# Patient Record
Sex: FEMALE | Race: WHITE
[De-identification: names, ages, dates, MRNs, and addresses within clinical notes are randomized per-mention and may not be internally consistent; named-entity substitution may affect disease eponyms.]

---

## 2017-01-22 ENCOUNTER — HOSPITAL ENCOUNTER (EMERGENCY)
Dept: HOSPITAL 53 - M ED | Age: 27
Discharge: HOME | End: 2017-01-22
Payer: COMMERCIAL

## 2017-01-22 DIAGNOSIS — R21: Primary | ICD-10-CM

## 2017-01-22 DIAGNOSIS — F17.210: ICD-10-CM

## 2017-01-22 PROCEDURE — 96374 THER/PROPH/DIAG INJ IV PUSH: CPT

## 2017-01-22 PROCEDURE — 99284 EMERGENCY DEPT VISIT MOD MDM: CPT

## 2017-01-22 PROCEDURE — 96375 TX/PRO/DX INJ NEW DRUG ADDON: CPT

## 2017-01-22 NOTE — EDDOCDS
Physician Documentation                                                                           

[facility]                                                                         

Name: Windy Arita                                                                              

Age: 26 yrs                                                                                       

Sex: Female                                                                                       

: 1990                                                                                   

MRN: [id number]                                                                                     

Arrival Date: 2017                                                                          

Time: 20:37                                                                                       

Account#: [id number]                                                                              

Bed 8                                                                                             

Private MD: NO PRIMARY PHYSICIAN, .                                                               

Disposition:                                                                                      

17 22:49 Discharged to Home/Self Care. Impression: Allergy, unspecified.                    

- Condition is Stable.                                                                            

                                                                                                  

- Prescriptions for Prednisone 20 mg Oral Tablet - take 3 tablets by ORAL route once              

daily for 3 days; 9 tablet.                                                                     

- Medication Reconciliation, Local Pharmacy Hours form.                                           

- Follow up: Private Physician; When: Call to arrange an appointment; Reason: Recheck             

today's complaints.                                                                             

- Problem is new.                                                                                 

- Symptoms have improved.                                                                         

                                                                                                  

                                                                                                  

                                                                                                  

Historical:                                                                                       

- Allergies: No known drug Allergies;                                                             

- Home Meds:                                                                                      

1. none                                                                                         

- PMHx: none;                                                                                     

- PSHx: none;                                                                                     

- Social history: Smoking status: Patient uses tobacco products, heavy tobacco smoker.            

No barriers to communication noted, The patient speaks fluent English, Speaks                   

appropriately for age.                                                                          

- Family history: Not pertinent.                                                                  

- : The pt / caregiver states he / she is not on anticoagulants. Home medication list             

is obtained from the patient.                                                                   

- Exposure Risk Screening:: None identified.                                                      

                                                                                                  

                                                                                                  

OB/GYN:                                                                                           

                                                                                             

23:02 LMP N/A - Birth control method                                                          ko2 

                                                                                                  

Vital Signs:                                                                                      

20:39  / 86; Pulse 93; Resp 18 S; Temp 98.4(O); Pulse Ox 99% on R/A; Weight 97.52 kg /  gr2 

      214.99 lbs (R); Height 5 ft. 4 in. (162.56 cm) (R); Pain 2/10;                              

22:51  / 76; Pulse 68; Resp 18; Temp 97.2(TE); Pulse Ox 100% on R/A;                    kermit 

20:39 Body Mass Index 36.90 (97.52 kg, 162.56 cm)                                             gr2 

                                                                                                  

MDM:                                                                                              

20:53 IV Saline Lock ordered.                                                                 cs11

20:53 diphenhydrAMINE 50 mg IVP once ordered.                                                 cs11

20:53 Dexamethasone 12 mg IV at bolus once ordered.                                           cs11

20:58 diphenhydrAMINE 25 mg IVP once ordered.                                                 cs11

20:59 Financial registration complete.                                                        gb  

21:36 [facility] Payment Agreement was scanned into [employer] and attached to record.               gb  

                                                                                                  

Administered Medications:                                                                         

20:58 Not Given (..): diphenhydrAMINE 50 mg IVP once                                          cs11

21:08 Drug: diphenhydrAMINE 25 mg [diphenhydramine 50 mg/mL injection solution (0.5 mL)]      ko2 

      Route: IVP; Site: left antecubital;                                                         

21:09 Drug: Dexamethasone 12 mg [dexamethasone 4 mg/mL injection solution] Route: IV; Rate:   ko2 

      bolus; Site: left antecubital;                                                              

                                                                                                  

                                                                                                  

Signatures:                                                                                       

Clarissa Walsh, Reg                  Reg  Otilio Hamilton DO                       DO   cs11                                                 

Dannielle AlcarazRN                           RN   ko2                                                  

                                                                                                  

The chart was reviewed and I authenticate all verbal orders and agree with the evaluation and 
treatment provided.Attachments:

21:36 [facility] Payment Agreement                                                                gb  

                                                                                                  

**************************************************************************************************

MTDD

## 2017-01-22 NOTE — EDDOCDS
Nurse's Notes                                                                                     

[facility]                                                                         

Name: Windy Arita                                                                              

Age: 26 yrs                                                                                       

Sex: Female                                                                                       

: 1990                                                                                   

MRN: [id number]                                                                                     

Arrival Date: 2017                                                                          

Time: 20:37                                                                                       

Account#: [id number]                                                                              

Bed 8                                                                                             

Private MD: NO PRIMARY PHYSICIAN, .                                                               

Diagnosis: Allergy, unspecified                                                                   

                                                                                                  

Presentation:                                                                                     

                                                                                             

20:45 Presenting complaint: Patient states: Eyes started itching around 3pm then started to   ko2 

      swell. Then pt noticed hives on arms, neck and side of abdomen. Pt states her chest         

      feels a little heavy a this time. Pt states she hasn't had any different food and           

      hasn't changed laundry detergent. The only new thing she has used is a new body wash        

      but has been using it for a week with no problem. Onset: The symptoms/episode               

      began/occurred gradually. This patient has not experienced a previous allergic              

      reaction. Anaphylaxis evaluation, the patient reports or I have noted the following         

      symptoms which indicate a significant risk of anaphylaxis: lump in the throat which may     

      suggest laryngeal edema. Adult Sepsis Screening: The patient does not have new or           

      worsening altered mentation. Patient's respiratory rate is less than 22. Systolic blood     

      pressure is greater than 100. Patient has a qSOFA score of 0- Negative Sepsis Screen.       

      Suicide/Homicide risk assessment- the patient denies having any suicidal and/or             

      homicidal ideations and does not present with any other emotional, behavioral or mental     

      health complaints.  Status: Patient is not a  or              

      dependent. Transition of care: patient was not received from another setting of care.       

      Care prior to arrival: Medications administered prior to arrival: Pt took two benadryl      

      liquid tabs about 8 pm.                                                                     

20:45 Acuity: MARYCARMEN Level 3                                                                     ko2 

20:45 Method Of Arrival: Walkin/Carried/Asstd                                                 ko2 

                                                                                                  

Triage Assessment:                                                                                

20:52 General: Appears in no apparent distress, Behavior is appropriate for age, cooperative. ko2 

      Pain: Denies pain. HIV screening NA for this visit Offered previously. The patient is       

      triaged at the bedside. See Assessment in Nurses Notes section of ED record.                

      Neurological: Level of Consciousness is awake, alert, Oriented to person, place, time.      

      EENT: Eyes Lid(s) bilateral eye lids swollen. Cardiovascular: Rhythm is sinus rhythm No     

      ectopy. Chest pain is denied. Respiratory: Airway is patent Respiratory effort is even,     

      unlabored, Respiratory pattern is regular, Reports lump in throat feeling. Derm: Hives      

      on right arm, left neck line and left side of abdomen. Musculoskeletal: Range of motion     

      intact in all extremities.                                                                  

                                                                                                  

OB/GYN:                                                                                           

23:02 LMP N/A - Birth control method                                                          ko2 

                                                                                                  

Historical:                                                                                       

- Allergies: No known drug Allergies;                                                             

- Home Meds:                                                                                      

1. none                                                                                         

- PMHx: none;                                                                                     

- PSHx: none;                                                                                     

- Social history: Smoking status: Patient uses tobacco products, heavy tobacco smoker.            

No barriers to communication noted, The patient speaks fluent English, Speaks                   

appropriately for age.                                                                          

- Family history: Not pertinent.                                                                  

- : The pt / caregiver states he / she is not on anticoagulants. Home medication list             

is obtained from the patient.                                                                   

- Exposure Risk Screening:: None identified.                                                      

                                                                                                  

                                                                                                  

Screenin:07 Screening information is obtained from the patient. Fall risk: No risks identified.     ko2 

      Assistance ADL's: requires no assistance with activities of daily living. Abuse/DV          

      Screen: The patient / caregiver reports he/she is: not in a situation that causes fear,     

      pain or injury. Nutritional screening: No deficits noted. Advance Directives:               

      Currently, there is no health care proxy. There is no active DNR order. There is no         

      living will. There is no Power of . home support is adequate.                       

                                                                                                  

Assessment:                                                                                       

20:54 General: See triage assessment.                                                         ko2 

22:06 General: pt states she can still feel the lump in her throat but can open her right eye ko2 

      lid more. No new hives noted. Respirations unlabored and even. no concerns at this          

      time..                                                                                      

22:49 General: Appears in no apparent distress, Behavior is cooperative. Pain: Denies pain.   ko2 

      Neurological: Level of Consciousness is awake, alert. Respiratory: Airway is patent         

      Respiratory effort is even, unlabored, Breath sounds are clear bilaterally.                 

      Musculoskeletal: Range of motion intact in all extremities.                                 

                                                                                                  

Vital Signs:                                                                                      

20:39  / 86; Pulse 93; Resp 18 S; Temp 98.4(O); Pulse Ox 99% on R/A; Weight 97.52 kg    gr2 

      (R); Height 5 ft. 4 in. (162.56 cm) (R); Pain 2/10;                                         

22:51  / 76; Pulse 68; Resp 18; Temp 97.2(TE); Pulse Ox 100% on R/A;                    kermit 

20:39 Body Mass Index 36.90 (97.52 kg, 162.56 cm)                                             gr2 

                                                                                                  

Vitals:                                                                                           

20:39 Log In Time: 2017 at 20:39. RN notified that patient meets Red Flag         gr2 

      criteria.                                                                                   

                                                                                                  

ED Course:                                                                                        

20:38 Patient visited by Lulú García.                                                   gr2 

20:38 NO PRIMARY PHYSICIAN, . is Private Physician.                                           gr2 

20:38 Patient moved to Waiting                                                                gr2 

20:41 Dannielle Alcaraz RN is Primary Nurse.                                                         cjh 

20:41 Patient moved to 8                                                                      cj 

20:42 Patient visited by Lulú García.                                                   gr2 

20:45 Inserted saline lock: 20 gauge in left antecubital area.                                ko2 

20:48 Otilio Natarajan DO is Attending Physician.                                               cs11

20:48 Patient visited by Otilio Natarajan DO.                                                   cs11

20:50 Triage Initiated                                                                        ko2 

21:09 Patient visited by Dannielle Alcaraz RN.                                                       ko2 

21:36 [facility] Payment Agreement was scanned into [employer] and attached to record.               gb  

22:04 Patient visited by Dannielle Alcaraz RN.                                                       ko2 

22:49 Patient visited by Dannielle Alcaraz RN.                                                       ko2 

22:49 Discontinued lock intact, bleeding controlled, pressure dressing applied, No            ko2 

      redness/swelling at site.                                                                   

22:51 Patient visited by Evelyne Valenzuela PCA.                                                 kermit 

23:01 The patient / caregiver is instructed regarding the plan of care and ED course.         ko2 

23:01 No procedures done that require assistance.                                             ko2 

                                                                                                  

Administered Medications:                                                                         

20:58 Not Given (..): diphenhydrAMINE 50 mg IVP once                                          cs11

21:08 Drug: diphenhydrAMINE 25 mg [diphenhydramine 50 mg/mL injection solution (0.5 mL)]      ko2 

      Route: IVP; Site: left antecubital;                                                         

21:09 Drug: Dexamethasone 12 mg [dexamethasone 4 mg/mL injection solution] Route: IV; Rate:   ko2 

      bolus; Site: left antecubital;                                                              

                                                                                                  

                                                                                                  

Order Results:                                                                                    

There are currently no results for this order.                                                    

Outcome:                                                                                          

22:49 Discharge ordered by Provider.                                                          cs11

23:01 Discharge Assessment: Patient awake, alert and oriented x 3. No cognitive and/or        ko2 

      functional deficits noted. Patient verbalized understanding of disposition                  

      instructions. patient administered narcotics - no. The following High Risk Discharge        

      criteria are identified: None. Discharged to home via medicaid cab. Condition: stable.      

      Discharge instructions given to patient, Instructed on discharge instructions, follow       

      up and referral plans. medication usage, Demonstrated understanding of instructions,        

      medications, Pt was receptive of discharge instructions/ teaching. Prescriptions given      

      X 1. No special radiology studies were completed. Property sent home with patient.          

23:03 Patient left the ED.                                                                    ko2 

                                                                                                  

Signatures:                                                                                       

Clarissa Walsh, Reg                  Reg  gb                                                   

Evelyne Valenzuela, JUSTIN                     PCA  kermit                                                  

Christianne Oquendo,RN                          RN   [facility]                                                  

Otilio Natarajan DO                       DO   cs11                                                 

Lulú García                            gr2                                                  

Dannielle Alcaraz,RN                           RN   ko2                                                  

                                                                                                  

**************************************************************************************************

MTDD

## 2017-01-25 NOTE — EDDOCDS
Physician Documentation                                                                           

[facility]                                                                         

Name: Windy Arita                                                                              

Age: 26 yrs                                                                                       

Sex: Female                                                                                       

: 1990                                                                                   

MRN: [id number]                                                                                     

Arrival Date: 2017                                                                          

Time: 20:37                                                                                       

Account#: [id number]                                                                              

Bed 8                                                                                             

Private MD: NO PRIMARY PHYSICIAN, .                                                               

Disposition:                                                                                      

17 22:49 Discharged to Home/Self Care. Impression: Allergy, unspecified.                    

- Condition is Stable.                                                                            

                                                                                                  

- Prescriptions for Prednisone 20 mg Oral Tablet - take 3 tablets by ORAL route once              

daily for 3 days; 9 tablet.                                                                     

- Medication Reconciliation, Local Pharmacy Hours form.                                           

- Follow up: Private Physician; When: Call to arrange an appointment; Reason: Recheck             

today's complaints.                                                                             

- Problem is new.                                                                                 

- Symptoms have improved.                                                                         

                                                                                                  

                                                                                                  

                                                                                                  

Historical:                                                                                       

- Allergies: No known drug Allergies;                                                             

- Home Meds:                                                                                      

1. none                                                                                         

- PMHx: none;                                                                                     

- PSHx: none;                                                                                     

- Social history: Smoking status: Patient uses tobacco products, heavy tobacco smoker.            

No barriers to communication noted, The patient speaks fluent English, Speaks                   

appropriately for age.                                                                          

- Family history: Not pertinent.                                                                  

- : The pt / caregiver states he / she is not on anticoagulants. Home medication list             

is obtained from the patient.                                                                   

- Exposure Risk Screening:: None identified.                                                      

                                                                                                  

                                                                                                  

OB/GYN:                                                                                           

                                                                                             

23:02 LMP N/A - Birth control method                                                          ko2 

                                                                                                  

Vital Signs:                                                                                      

20:39  / 86; Pulse 93; Resp 18 S; Temp 98.4(O); Pulse Ox 99% on R/A; Weight 97.52 kg /  gr2 

      214.99 lbs (R); Height 5 ft. 4 in. (162.56 cm) (R); Pain 2/10;                              

22:51  / 76; Pulse 68; Resp 18; Temp 97.2(TE); Pulse Ox 100% on R/A;                    kermit 

20:39 Body Mass Index 36.90 (97.52 kg, 162.56 cm)                                             gr2 

                                                                                                  

MDM:                                                                                              

20:53 IV Saline Lock ordered.                                                                 cs11

20:53 diphenhydrAMINE 50 mg IVP once ordered.                                                 cs11

20:53 Dexamethasone 12 mg IV at bolus once ordered.                                           cs11

20:58 diphenhydrAMINE 25 mg IVP once ordered.                                                 cs11

20:59 Financial registration complete.                                                        gb  

21:36 [facility] Payment Agreement was scanned into [employer] and attached to record.               gb  

                                                                                             

12:29 T-Sheet-- Draft Copy was scanned into [employer] and attached to record.                   gb  

                                                                                                  

Administered Medications:                                                                         

                                                                                             

20:58 Not Given (..): diphenhydrAMINE 50 mg IVP once                                          cs11

21:08 Drug: diphenhydrAMINE 25 mg [diphenhydramine 50 mg/mL injection solution (0.5 mL)]      ko2 

      Route: IVP; Site: left antecubital;                                                         

21:09 Drug: Dexamethasone 12 mg [dexamethasone 4 mg/mL injection solution] Route: IV; Rate:   ko2 

      bolus; Site: left antecubital;                                                              

                                                                                                  

                                                                                                  

Signatures:                                                                                       

Clarissa Walsh, Reg                  Reg  Otilio Hamilton DO                       DO   cs11                                                 

Dannielle Alcaraz RN                           RN   ko2                                                  

                                                                                                  

The chart was reviewed and I authenticate all verbal orders and agree with the evaluation and 
treatment provided.Attachments:

21:36 [facility] Payment Agreement                                                                gb  

                                                                                             

12:29 T-Sheet-- Draft Copy                                                                    gb  

                                                                                                  

**************************************************************************************************



*** Chart Complete ***
MTDD

## 2017-01-25 NOTE — EDDOCDS
Nurse's Notes                                                                                     

[facility]                                                                         

Name: Windy Arita                                                                              

Age: 26 yrs                                                                                       

Sex: Female                                                                                       

: 1990                                                                                   

MRN: [id number]                                                                                     

Arrival Date: 2017                                                                          

Time: 20:37                                                                                       

Account#: [id number]                                                                              

Bed 8                                                                                             

Private MD: NO PRIMARY PHYSICIAN, .                                                               

Diagnosis: Allergy, unspecified                                                                   

                                                                                                  

Presentation:                                                                                     

                                                                                             

20:45 Presenting complaint: Patient states: Eyes started itching around 3pm then started to   ko2 

      swell. Then pt noticed hives on arms, neck and side of abdomen. Pt states her chest         

      feels a little heavy a this time. Pt states she hasn't had any different food and           

      hasn't changed laundry detergent. The only new thing she has used is a new body wash        

      but has been using it for a week with no problem. Onset: The symptoms/episode               

      began/occurred gradually. This patient has not experienced a previous allergic              

      reaction. Anaphylaxis evaluation, the patient reports or I have noted the following         

      symptoms which indicate a significant risk of anaphylaxis: lump in the throat which may     

      suggest laryngeal edema. Adult Sepsis Screening: The patient does not have new or           

      worsening altered mentation. Patient's respiratory rate is less than 22. Systolic blood     

      pressure is greater than 100. Patient has a qSOFA score of 0- Negative Sepsis Screen.       

      Suicide/Homicide risk assessment- the patient denies having any suicidal and/or             

      homicidal ideations and does not present with any other emotional, behavioral or mental     

      health complaints.  Status: Patient is not a  or              

      dependent. Transition of care: patient was not received from another setting of care.       

      Care prior to arrival: Medications administered prior to arrival: Pt took two benadryl      

      liquid tabs about 8 pm.                                                                     

20:45 Acuity: MARYCARMEN Level 3                                                                     ko2 

20:45 Method Of Arrival: Walkin/Carried/Asstd                                                 ko2 

                                                                                                  

Triage Assessment:                                                                                

20:52 General: Appears in no apparent distress, Behavior is appropriate for age, cooperative. ko2 

      Pain: Denies pain. HIV screening NA for this visit Offered previously. The patient is       

      triaged at the bedside. See Assessment in Nurses Notes section of ED record.                

      Neurological: Level of Consciousness is awake, alert, Oriented to person, place, time.      

      EENT: Eyes Lid(s) bilateral eye lids swollen. Cardiovascular: Rhythm is sinus rhythm No     

      ectopy. Chest pain is denied. Respiratory: Airway is patent Respiratory effort is even,     

      unlabored, Respiratory pattern is regular, Reports lump in throat feeling. Derm: Hives      

      on right arm, left neck line and left side of abdomen. Musculoskeletal: Range of motion     

      intact in all extremities.                                                                  

                                                                                                  

OB/GYN:                                                                                           

23:02 LMP N/A - Birth control method                                                          ko2 

                                                                                                  

Historical:                                                                                       

- Allergies: No known drug Allergies;                                                             

- Home Meds:                                                                                      

1. none                                                                                         

- PMHx: none;                                                                                     

- PSHx: none;                                                                                     

- Social history: Smoking status: Patient uses tobacco products, heavy tobacco smoker.            

No barriers to communication noted, The patient speaks fluent English, Speaks                   

appropriately for age.                                                                          

- Family history: Not pertinent.                                                                  

- : The pt / caregiver states he / she is not on anticoagulants. Home medication list             

is obtained from the patient.                                                                   

- Exposure Risk Screening:: None identified.                                                      

                                                                                                  

                                                                                                  

Screenin:07 Screening information is obtained from the patient. Fall risk: No risks identified.     ko2 

      Assistance ADL's: requires no assistance with activities of daily living. Abuse/DV          

      Screen: The patient / caregiver reports he/she is: not in a situation that causes fear,     

      pain or injury. Nutritional screening: No deficits noted. Advance Directives:               

      Currently, there is no health care proxy. There is no active DNR order. There is no         

      living will. There is no Power of . home support is adequate.                       

                                                                                                  

Assessment:                                                                                       

20:54 General: See triage assessment.                                                         ko2 

22:06 General: pt states she can still feel the lump in her throat but can open her right eye ko2 

      lid more. No new hives noted. Respirations unlabored and even. no concerns at this          

      time..                                                                                      

22:49 General: Appears in no apparent distress, Behavior is cooperative. Pain: Denies pain.   ko2 

      Neurological: Level of Consciousness is awake, alert. Respiratory: Airway is patent         

      Respiratory effort is even, unlabored, Breath sounds are clear bilaterally.                 

      Musculoskeletal: Range of motion intact in all extremities.                                 

                                                                                                  

Vital Signs:                                                                                      

20:39  / 86; Pulse 93; Resp 18 S; Temp 98.4(O); Pulse Ox 99% on R/A; Weight 97.52 kg    gr2 

      (R); Height 5 ft. 4 in. (162.56 cm) (R); Pain 2/10;                                         

22:51  / 76; Pulse 68; Resp 18; Temp 97.2(TE); Pulse Ox 100% on R/A;                    kermit 

20:39 Body Mass Index 36.90 (97.52 kg, 162.56 cm)                                             gr2 

                                                                                                  

Vitals:                                                                                           

20:39 Log In Time: 2017 at 20:39. RN notified that patient meets Red Flag         gr2 

      criteria.                                                                                   

                                                                                                  

ED Course:                                                                                        

20:38 Patient visited by Lulú García.                                                   gr2 

20:38 NO PRIMARY PHYSICIAN, . is Private Physician.                                           gr2 

20:38 Patient moved to Waiting                                                                gr2 

20:41 Dannielle Alcaraz RN is Primary Nurse.                                                         cjh 

20:41 Patient moved to 8                                                                      cj 

20:42 Patient visited by Lulú García.                                                   gr2 

20:45 Inserted saline lock: 20 gauge in left antecubital area.                                ko2 

20:48 Otilio Natarajan DO is Attending Physician.                                               cs11

20:48 Patient visited by Otilio Natarajan DO.                                                   cs11

20:50 Triage Initiated                                                                        ko2 

21:09 Patient visited by Dannielle Alcaraz RN.                                                       ko2 

21:36 [facility] Payment Agreement was scanned into [employer] and attached to record.               gb  

22:04 Patient visited by Dannielle Alcaraz RN.                                                       ko2 

22:49 Patient visited by Dannielle Alcaraz RN.                                                       ko2 

22:49 Discontinued lock intact, bleeding controlled, pressure dressing applied, No            ko2 

      redness/swelling at site.                                                                   

22:51 Patient visited by Evelyne Valenzuela PCA.                                                 kermit 

23:01 The patient / caregiver is instructed regarding the plan of care and ED course.         ko2 

23:01 No procedures done that require assistance.                                             ko2 

                                                                                             

12:29 T-Sheet-- Draft Copy was scanned into [employer] and attached to record.                   gb  

                                                                                                  

Administered Medications:                                                                         

                                                                                             

20:58 Not Given (..): diphenhydrAMINE 50 mg IVP once                                          cs11

21:08 Drug: diphenhydrAMINE 25 mg [diphenhydramine 50 mg/mL injection solution (0.5 mL)]      ko2 

      Route: IVP; Site: left antecubital;                                                         

21:09 Drug: Dexamethasone 12 mg [dexamethasone 4 mg/mL injection solution] Route: IV; Rate:   ko2 

      bolus; Site: left antecubital;                                                              

                                                                                                  

                                                                                                  

Order Results:                                                                                    

There are currently no results for this order.                                                    

Outcome:                                                                                          

22:49 Discharge ordered by Provider.                                                          cs11

23:01 Discharge Assessment: Patient awake, alert and oriented x 3. No cognitive and/or        ko2 

      functional deficits noted. Patient verbalized understanding of disposition                  

      instructions. patient administered narcotics - no. The following High Risk Discharge        

      criteria are identified: None. Discharged to home via medicaid cab. Condition: stable.      

      Discharge instructions given to patient, Instructed on discharge instructions, follow       

      up and referral plans. medication usage, Demonstrated understanding of instructions,        

      medications, Pt was receptive of discharge instructions/ teaching. Prescriptions given      

      X 1. No special radiology studies were completed. Property sent home with patient.          

23:03 Patient left the ED.                                                                    ko2 

                                                                                                  

Signatures:                                                                                       

Clarissa Walsh, Reg                  Reg  gb                                                   

Evelyne Valenzuela, PCA                     PCA  kermit                                                  

Christianne Oquendo,RN                          RN   [facility]                                                  

Otilio Natarajan,                        DO   cs11                                                 

Lulú García                            gr2                                                  

Dannielle Alcaraz,RN                           RN   ko2                                                  

                                                                                                  

**************************************************************************************************



*** Chart Complete ***
MTDD

## 2017-01-25 NOTE — EDDOCDS
Physician Documentation                                                                           

[facility]                                                                         

Name: Windy Arita                                                                              

Age: 26 yrs                                                                                       

Sex: Female                                                                                       

: 1990                                                                                   

MRN: [id number]                                                                                     

Arrival Date: 2017                                                                          

Time: 20:37                                                                                       

Account#: [id number]                                                                              

Bed 8                                                                                             

Private MD: NO PRIMARY PHYSICIAN, .                                                               

Disposition:                                                                                      

17 22:49 Discharged to Home/Self Care. Impression: Allergy, unspecified.                    

- Condition is Stable.                                                                            

                                                                                                  

- Prescriptions for Prednisone 20 mg Oral Tablet - take 3 tablets by ORAL route once              

daily for 3 days; 9 tablet.                                                                     

- Medication Reconciliation, Local Pharmacy Hours form.                                           

- Follow up: Private Physician; When: Call to arrange an appointment; Reason: Recheck             

today's complaints.                                                                             

- Problem is new.                                                                                 

- Symptoms have improved.                                                                         

                                                                                                  

                                                                                                  

                                                                                                  

Historical:                                                                                       

- Allergies: No known drug Allergies;                                                             

- Home Meds:                                                                                      

1. none                                                                                         

- PMHx: none;                                                                                     

- PSHx: none;                                                                                     

- Social history: Smoking status: Patient uses tobacco products, heavy tobacco smoker.            

No barriers to communication noted, The patient speaks fluent English, Speaks                   

appropriately for age.                                                                          

- Family history: Not pertinent.                                                                  

- : The pt / caregiver states he / she is not on anticoagulants. Home medication list             

is obtained from the patient.                                                                   

- Exposure Risk Screening:: None identified.                                                      

                                                                                                  

                                                                                                  

OB/GYN:                                                                                           

                                                                                             

23:02 LMP N/A - Birth control method                                                          ko2 

                                                                                                  

Vital Signs:                                                                                      

20:39  / 86; Pulse 93; Resp 18 S; Temp 98.4(O); Pulse Ox 99% on R/A; Weight 97.52 kg /  gr2 

      214.99 lbs (R); Height 5 ft. 4 in. (162.56 cm) (R); Pain 2/10;                              

22:51  / 76; Pulse 68; Resp 18; Temp 97.2(TE); Pulse Ox 100% on R/A;                    kermit 

20:39 Body Mass Index 36.90 (97.52 kg, 162.56 cm)                                             gr2 

                                                                                                  

MDM:                                                                                              

20:53 IV Saline Lock ordered.                                                                 cs11

20:53 diphenhydrAMINE 50 mg IVP once ordered.                                                 cs11

20:53 Dexamethasone 12 mg IV at bolus once ordered.                                           cs11

20:58 diphenhydrAMINE 25 mg IVP once ordered.                                                 cs11

20:59 Financial registration complete.                                                        gb  

21:36 [facility] Payment Agreement was scanned into [employer] and attached to record.               gb  

                                                                                             

12:29 T-Sheet-- Draft Copy was scanned into [employer] and attached to record.                   gb  

                                                                                                  

Administered Medications:                                                                         

                                                                                             

20:58 Not Given (..): diphenhydrAMINE 50 mg IVP once                                          cs11

21:08 Drug: diphenhydrAMINE 25 mg [diphenhydramine 50 mg/mL injection solution (0.5 mL)]      ko2 

      Route: IVP; Site: left antecubital;                                                         

21:09 Drug: Dexamethasone 12 mg [dexamethasone 4 mg/mL injection solution] Route: IV; Rate:   ko2 

      bolus; Site: left antecubital;                                                              

                                                                                                  

                                                                                                  

Signatures:                                                                                       

Clarissa Walsh, Reg                  Reg  Oitlio Hamilton DO                       DO   cs11                                                 

Dannielle Alcaraz RN                           RN   ko2                                                  

                                                                                                  

The chart was reviewed and I authenticate all verbal orders and agree with the evaluation and 
treatment provided.Attachments:

21:36 [facility] Payment Agreement                                                                gb  

                                                                                             

12:29 T-Sheet-- Draft Copy                                                                    gb  

                                                                                                  

**************************************************************************************************



*** Chart Complete ***
MTDD

## 2017-02-16 ENCOUNTER — HOSPITAL ENCOUNTER (EMERGENCY)
Dept: HOSPITAL 53 - M ED | Age: 27
Discharge: HOME | End: 2017-02-16
Payer: COMMERCIAL

## 2017-02-16 DIAGNOSIS — Z79.52: ICD-10-CM

## 2017-02-16 DIAGNOSIS — F17.210: ICD-10-CM

## 2017-02-16 DIAGNOSIS — Z79.899: ICD-10-CM

## 2017-02-16 DIAGNOSIS — R21: Primary | ICD-10-CM

## 2017-02-16 PROCEDURE — 99283 EMERGENCY DEPT VISIT LOW MDM: CPT

## 2017-02-16 PROCEDURE — 96372 THER/PROPH/DIAG INJ SC/IM: CPT

## 2017-02-16 PROCEDURE — 87880 STREP A ASSAY W/OPTIC: CPT

## 2017-02-16 NOTE — EDDOCDS
Nurse's Notes                                                                                     

[facility]                                                                         

Name: Windy Arita                                                                              

Age: 26 yrs                                                                                       

Sex: Female                                                                                       

: 1990                                                                                   

MRN: [id number]                                                                                     

Arrival Date: 2017                                                                          

Time: 08:57                                                                                       

Account#: [id number]                                                                              

Bed PR                                                                                      

Private MD:                                                                                       

Diagnosis: Streptococcal pharyngitis;Urticaria                                                    

                                                                                                  

Presentation:                                                                                     

                                                                                             

09:11 Presenting complaint: Patient states: Started having a reaction three weeks ago to      jo3 

      unknown allergen. Was seen and treated here. Got better for three days until the            

      prednisone wore off. Has been seen again at [facility] and is again on Prednisone and        

      continues to break out. Also taking Benadryl. Has an appointment with the allergist         

      . Adult Sepsis Screening: The patient does not have new or worsening altered      

      mentation. Patient's respiratory rate is less than 22. Systolic blood pressure is           

      greater than 100. Patient has a qSOFA score of 0- Negative Sepsis Screen.                   

      Suicide/Homicide risk assessment- the patient denies having any suicidal and/or             

      homicidal ideations and does not present with any other emotional, behavioral or mental     

      health complaints.  Status: Patient is not a  or              

      dependent. Transition of care: patient was not received from another setting of care.       

09:11 Acuity: MARYCARMEN Level 3                                                                     jo3 

09:11 Method Of Arrival: Walkin/Carried/Asstd                                                 jo3 

                                                                                                  

Triage Assessment:                                                                                

09:19 General: Appears uncomfortable, Behavior is appropriate for age, cooperative, pleasant. jo3 

      HIV screening NA for this visit Offered previously. Neurological: No deficits noted.        

      Level of Consciousness is awake, alert, Oriented to person, place, time. Respiratory:       

      Airway is patent Respiratory effort is even, unlabored. Derm: Skin is pink, warm & dry.   

      Hive rash systemically.                                                                     

                                                                                                  

OB/GYN:                                                                                           

09:19 LMP 2017                                                                            jo3 

                                                                                                  

Historical:                                                                                       

- Allergies: No known drug Allergies;                                                             

- Home Meds:                                                                                      

1. Benadryl 50 mg Oral cap once daily                                                           

2. prednisone 20 mg Oral tab once daily                                                         

- PMHx: none;                                                                                     

- PSHx: none;                                                                                     

- Social history: Smoking status: Patient uses tobacco products, heavy tobacco smoker.            

No barriers to communication noted, The patient speaks fluent English, Speaks                   

appropriately for age.                                                                          

- Family history: Not pertinent.                                                                  

- : The pt / caregiver states he / she is not on anticoagulants. Home medication list             

is obtained from the patient.                                                                   

- Exposure Risk Screening:: None identified.                                                      

                                                                                                  

                                                                                                  

Screenin:42 Screening information is obtained from the patient. Fall risk: No risks identified.     ck1 

      Assistance ADL's: requires no assistance with activities of daily living. Abuse/DV          

      Screen: The patient / caregiver reports he/she is: not in a situation that causes fear,     

      pain or injury. Nutritional screening: No deficits noted. Advance Directives:               

      Currently, there is no health care proxy. home support is adequate.                         

                                                                                                  

Assessment:                                                                                       

09:42 General: Appears in no apparent distress, comfortable, Behavior is appropriate for age, ck1 

      cooperative. Pain: Location: generalized Pain currently is 8 out of 10 on a pain scale.     

      Quality of pain is described as soreness. Respiratory: Airway is patent Respiratory         

      effort is even, unlabored, Respiratory pattern is regular, symmetrical. Derm: Rash          

      noted that is red, raised, on face, back, abdomen, right arm and left arm Reports           

      itching.                                                                                    

10:44 General: Appears in no apparent distress, comfortable, Behavior is appropriate for age, ck1 

      cooperative. Pain: Denies pain. Neurological: Level of Consciousness is awake, alert,       

      obeys commands, Oriented to person, place, time. Respiratory: Respiratory effort is         

      unlabored, Respiratory pattern is regular, symmetrical. Derm: Rash noted that is red,       

      raised, on left arm and right arm and abdomen and back and face Denies itching.             

      Musculoskeletal: Circulation, motion, and sensation intact Range of motion intact in        

      all extremities.                                                                            

                                                                                                  

Vital Signs:                                                                                      

09:12  / 99; Pulse 110; Resp 18; Temp 97.5(O); Pulse Ox 98% on R/A; Weight 102.06 kg    jb5 

      (R); Height 5 ft. 3 in. (160.02 cm) (R); Pain 9/10;                                         

10:43  / 89; Pulse 107; Resp 18; Temp 97.9(T); Pulse Ox 97% on R/A; Pain 0/10;          ck1 

09:12 Body Mass Index 39.86 (102.06 kg, 160.02 cm)                                            jb5 

09:12 irritation                                                                              jb5 

                                                                                                  

Vitals:                                                                                           

09:19 Log In Time: 2017 at 08:56.                                                jo3 

09:42 Strep Screen is obtained and tested: Positive.                                          ck1 

                                                                                                  

ED Course:                                                                                        

08:59 Patient visited by Ashish Kerr.                                                    mm15

08:59 Patient moved to Waiting                                                                mm15

09:09 Patient moved to Triage 1                                                               jo3 

09:13 Patient visited by Lindsay Rosa PCA.                                                   jb5 

09:16 Triage Initiated                                                                        jo3 

09:22 Patient visited by Sendy Gamez RN.                                                jo3 

09:24 [facility]                                                        ar2 

09:24 Ruben Jones MD is Attending Physician.                                               ar2 

09:24 Patient visited by Mike Gabriel PA-C.                                             ar2 

09:42 Patient visited by Debbie Douglas RN.                                              ck1 

09:42 The patient / caregiver is instructed regarding the plan of care and ED course.         ck1 

09:45 Patient moved to PR                                                               ck1 

10:10 Patient visited by Lindsay Rosa PCA.                                                   jb5 

10:19 [facility] Payment Agreement was scanned into [employer] and attached to record.               mm15

10:44 No IV's were initiated during this patient's visit. No procedures done that require     ck1 

      assistance.                                                                                 

                                                                                                  

Administered Medications:                                                                         

09:42 Drug: diphenhydrAMINE 50 mg [diphenhydramine 50 mg/mL injection solution (1 mL)] Route: ck1 

      IM; Site: right deltoid;                                                                    

                                                                                                  

                                                                                                  

Order Results:                                                                                    

There are currently no results for this order.                                                    

Outcome:                                                                                          

10:23 Discharge ordered by Provider.                                                          ar2 

10:44 Discharge Assessment: Patient awake, alert and oriented x 3. No cognitive and/or        ck1 

      functional deficits noted. Patient verbalized understanding of disposition                  

      instructions. patient administered narcotics - no. The following High Risk Discharge        

      criteria are identified: None. Discharged to home ambulatory, with friend. Condition:       

      improved. Discharge instructions given to patient, Instructed on discharge                  

      instructions, follow up and referral plans. medication usage, Demonstrated                  

      understanding of instructions, medications, Pt was receptive of discharge instructions/     

      teaching. Prescriptions given X 3. No special radiology studies were completed.             

      Property :Personal belongings accompany Pt.                                                 

10:45 Patient left the ED.                                                                    ck1 

                                                                                                  

Signatures:                                                                                       

Debbie Douglas RN                  RN   ck1                                                  

Lindsay Rosa PCA                       PCA  jb5                                                  

Sendy Gamez RN RN   jo3                                                  

Mike Gabriel PA-C PA-C ar2                                                  

Ashish Kerr                             mm15                                                 

                                                                                                  

**************************************************************************************************

MTDD

## 2017-02-16 NOTE — EDDOCDS
Physician Documentation                                                                           

[facility]                                                                         

Name: Windy Arita                                                                              

Age: 26 yrs                                                                                       

Sex: Female                                                                                       

: 1990                                                                                   

MRN: [id number]                                                                                     

Arrival Date: 2017                                                                          

Time: 08:57                                                                                       

Account#: [id number]                                                                              

Bed PR                                                                                      

Private MD:                                                                                       

Disposition:                                                                                      

17 10:23 Discharged to Home/Self Care. Impression: Streptococcal pharyngitis, Urticaria.    

- Condition is Stable.                                                                            

- Discharge Instructions: Hives, Strep Throat.                                                    

- Prescriptions for Keflex 500 mg Oral Capsule - take 1 capsule by ORAL route every 12            

hours for 10 days; 20 capsule. Pepcid 20 mg Oral Tablet - take 1 tablet by ORAL route           

every 12 hours for 10 days; 20 tablet. Claritin 10 mg Oral Tablet - take 1 tablet by            

ORAL route once daily As needed; 30 tablet.                                                     

- Medication Reconciliation, Local Pharmacy Hours form.                                           

- Follow up: Private Physician; When: As previously arranged; Reason: Further                     

diagnostic work-up, Recheck today's complaints, Continuance of care. Follow up:                 

Emergency Department; When: As needed; Reason: Trouble breathing, Worsening of                  

conditions.                                                                                     

- Problem is new.                                                                                 

- Symptoms have improved.                                                                         

                                                                                                  

                                                                                                  

                                                                                                  

Historical:                                                                                       

- Allergies: No known drug Allergies;                                                             

- Home Meds:                                                                                      

1. Benadryl 50 mg Oral cap once daily                                                           

2. prednisone 20 mg Oral tab once daily                                                         

- PMHx: none;                                                                                     

- PSHx: none;                                                                                     

- Social history: Smoking status: Patient uses tobacco products, heavy tobacco smoker.            

No barriers to communication noted, The patient speaks fluent English, Speaks                   

appropriately for age.                                                                          

- Family history: Not pertinent.                                                                  

- : The pt / caregiver states he / she is not on anticoagulants. Home medication list             

is obtained from the patient.                                                                   

- Exposure Risk Screening:: None identified.                                                      

                                                                                                  

                                                                                                  

OB/GYN:                                                                                           

                                                                                             

09:19 LMP 2017                                                                            jo3 

                                                                                                  

Vital Signs:                                                                                      

09:12  / 99; Pulse 110; Resp 18; Temp 97.5(O); Pulse Ox 98% on R/A; Weight 102.06 kg /  jb5 

      225 lbs (R); Height 5 ft. 3 in. (160.02 cm) (R); Pain 9/10;                                 

10:43  / 89; Pulse 107; Resp 18; Temp 97.9(T); Pulse Ox 97% on R/A; Pain 0/10;          ck1 

09:12 Body Mass Index 39.86 (102.06 kg, 160.02 cm)                                            jb5 

09:12 irritation                                                                              jb5 

                                                                                                  

MDM:                                                                                              

09:34 Strep Screen, Nursing ordered.                                                          ar2 

09:34 diphenhydrAMINE 50 mg IM once ordered.                                                  ar2 

09:59 Financial registration complete.                                                        mm15

10:19 [facility] Payment Agreement was scanned into [employer] and attached to record.               mm15

                                                                                                  

Administered Medications:                                                                         

09:42 Drug: diphenhydrAMINE 50 mg [diphenhydramine 50 mg/mL injection solution (1 mL)] Route: ck1 

      IM; Site: right deltoid;                                                                    

                                                                                                  

                                                                                                  

Signatures:                                                                                       

Debbie DouglasRN                  RN   ck1                                                  

Sendy Gamez RN                    RN   jo3                                                  

Mike Gabriel PA-C PA-C ar2                                                  

Ashish Kerr                             mm15                                                 

                                                                                                  

The chart was reviewed and I authenticate all verbal orders and agree with the evaluation and 
treatment provided.Attachments:

10:19 [facility] Payment Agreement                                                                mm15

                                                                                                  

**************************************************************************************************

MTDD

## 2017-02-18 NOTE — EDDOCDS
Physician Documentation                                                                           

[facility]                                                                         

Name: Windy Arita                                                                              

Age: 26 yrs                                                                                       

Sex: Female                                                                                       

: 1990                                                                                   

MRN: [id number]                                                                                     

Arrival Date: 2017                                                                          

Time: 08:57                                                                                       

Account#: [id number]                                                                              

Bed PR                                                                                      

Private MD:                                                                                       

Disposition:                                                                                      

17 10:23 Discharged to Home/Self Care. Impression: Streptococcal pharyngitis, Urticaria.    

- Condition is Stable.                                                                            

- Discharge Instructions: Hives, Strep Throat.                                                    

- Prescriptions for Keflex 500 mg Oral Capsule - take 1 capsule by ORAL route every 12            

hours for 10 days; 20 capsule. Pepcid 20 mg Oral Tablet - take 1 tablet by ORAL route           

every 12 hours for 10 days; 20 tablet. Claritin 10 mg Oral Tablet - take 1 tablet by            

ORAL route once daily As needed; 30 tablet.                                                     

- Medication Reconciliation, Local Pharmacy Hours form.                                           

- Follow up: Private Physician; When: As previously arranged; Reason: Further                     

diagnostic work-up, Recheck today's complaints, Continuance of care. Follow up:                 

Emergency Department; When: As needed; Reason: Trouble breathing, Worsening of                  

conditions.                                                                                     

- Problem is new.                                                                                 

- Symptoms have improved.                                                                         

                                                                                                  

                                                                                                  

                                                                                                  

Historical:                                                                                       

- Allergies: No known drug Allergies;                                                             

- Home Meds:                                                                                      

1. Benadryl 50 mg Oral cap once daily                                                           

2. prednisone 20 mg Oral tab once daily                                                         

- PMHx: none;                                                                                     

- PSHx: none;                                                                                     

- Social history: Smoking status: Patient uses tobacco products, heavy tobacco smoker.            

No barriers to communication noted, The patient speaks fluent English, Speaks                   

appropriately for age.                                                                          

- Family history: Not pertinent.                                                                  

- : The pt / caregiver states he / she is not on anticoagulants. Home medication list             

is obtained from the patient.                                                                   

- Exposure Risk Screening:: None identified.                                                      

                                                                                                  

                                                                                                  

OB/GYN:                                                                                           

                                                                                             

09:19 LMP 2017                                                                            jo3 

                                                                                                  

Vital Signs:                                                                                      

09:12  / 99; Pulse 110; Resp 18; Temp 97.5(O); Pulse Ox 98% on R/A; Weight 102.06 kg /  jb5 

      225 lbs (R); Height 5 ft. 3 in. (160.02 cm) (R); Pain 9/10;                                 

10:43  / 89; Pulse 107; Resp 18; Temp 97.9(T); Pulse Ox 97% on R/A; Pain 0/10;          ck1 

09:12 Body Mass Index 39.86 (102.06 kg, 160.02 cm)                                            jb5 

09:12 irritation                                                                              jb5 

                                                                                                  

MDM:                                                                                              

09:34 Strep Screen, Nursing ordered.                                                          ar2 

09:34 diphenhydrAMINE 50 mg IM once ordered.                                                  ar2 

09:59 Financial registration complete.                                                        mm15

10:19 [facility] Payment Agreement was scanned into [employer] and attached to record.               mm15

                                                                                             

12:34 T-Sheet-- Draft Copy was scanned into [employer] and attached to record.                   gb  

                                                                                                  

Administered Medications:                                                                         

                                                                                             

09:42 Drug: diphenhydrAMINE 50 mg [diphenhydramine 50 mg/mL injection solution (1 mL)] Route: ck1 

      IM; Site: right deltoid;                                                                    

                                                                                                  

                                                                                                  

Signatures:                                                                                       

Clarissa Walsh, Reg                  Reg  gb                                                   

Debbie Douglas,RN                  RN   ck1                                                  

Sendy Gamez RN                    RN   jo3                                                  

Mike Gabriel PA-C                 [place] ar2                                                  

Ashish Kerr                             mm15                                                 

                                                                                                  

The chart was reviewed and I authenticate all verbal orders and agree with the evaluation and 
treatment provided.Attachments:

10:19 [facility] Payment Agreement                                                                mm15

                                                                                             

12:34 T-Sheet-- Draft Copy                                                                    gb  

                                                                                                  

**************************************************************************************************



*** Chart Complete ***
MTDD

## 2017-02-18 NOTE — EDDOCDS
Physician Documentation                                                                           

[facility]                                                                         

Name: Windy Arita                                                                              

Age: 26 yrs                                                                                       

Sex: Female                                                                                       

: 1990                                                                                   

MRN: [id number]                                                                                     

Arrival Date: 2017                                                                          

Time: 08:57                                                                                       

Account#: [id number]                                                                              

Bed PR                                                                                      

Private MD:                                                                                       

Disposition:                                                                                      

17 10:23 Discharged to Home/Self Care. Impression: Streptococcal pharyngitis, Urticaria.    

- Condition is Stable.                                                                            

- Discharge Instructions: Hives, Strep Throat.                                                    

- Prescriptions for Keflex 500 mg Oral Capsule - take 1 capsule by ORAL route every 12            

hours for 10 days; 20 capsule. Pepcid 20 mg Oral Tablet - take 1 tablet by ORAL route           

every 12 hours for 10 days; 20 tablet. Claritin 10 mg Oral Tablet - take 1 tablet by            

ORAL route once daily As needed; 30 tablet.                                                     

- Medication Reconciliation, Local Pharmacy Hours form.                                           

- Follow up: Private Physician; When: As previously arranged; Reason: Further                     

diagnostic work-up, Recheck today's complaints, Continuance of care. Follow up:                 

Emergency Department; When: As needed; Reason: Trouble breathing, Worsening of                  

conditions.                                                                                     

- Problem is new.                                                                                 

- Symptoms have improved.                                                                         

                                                                                                  

                                                                                                  

                                                                                                  

Historical:                                                                                       

- Allergies: No known drug Allergies;                                                             

- Home Meds:                                                                                      

1. Benadryl 50 mg Oral cap once daily                                                           

2. prednisone 20 mg Oral tab once daily                                                         

- PMHx: none;                                                                                     

- PSHx: none;                                                                                     

- Social history: Smoking status: Patient uses tobacco products, heavy tobacco smoker.            

No barriers to communication noted, The patient speaks fluent English, Speaks                   

appropriately for age.                                                                          

- Family history: Not pertinent.                                                                  

- : The pt / caregiver states he / she is not on anticoagulants. Home medication list             

is obtained from the patient.                                                                   

- Exposure Risk Screening:: None identified.                                                      

                                                                                                  

                                                                                                  

OB/GYN:                                                                                           

                                                                                             

09:19 LMP 2017                                                                            jo3 

                                                                                                  

Vital Signs:                                                                                      

09:12  / 99; Pulse 110; Resp 18; Temp 97.5(O); Pulse Ox 98% on R/A; Weight 102.06 kg /  jb5 

      225 lbs (R); Height 5 ft. 3 in. (160.02 cm) (R); Pain 9/10;                                 

10:43  / 89; Pulse 107; Resp 18; Temp 97.9(T); Pulse Ox 97% on R/A; Pain 0/10;          ck1 

09:12 Body Mass Index 39.86 (102.06 kg, 160.02 cm)                                            jb5 

09:12 irritation                                                                              jb5 

                                                                                                  

MDM:                                                                                              

09:34 Strep Screen, Nursing ordered.                                                          ar2 

09:34 diphenhydrAMINE 50 mg IM once ordered.                                                  ar2 

09:59 Financial registration complete.                                                        mm15

10:19 [facility] Payment Agreement was scanned into [employer] and attached to record.               mm15

                                                                                             

12:34 T-Sheet-- Draft Copy was scanned into [employer] and attached to record.                   gb  

                                                                                                  

Administered Medications:                                                                         

                                                                                             

09:42 Drug: diphenhydrAMINE 50 mg [diphenhydramine 50 mg/mL injection solution (1 mL)] Route: ck1 

      IM; Site: right deltoid;                                                                    

                                                                                                  

                                                                                                  

Signatures:                                                                                       

Clarissa Walsh, Reg                  Reg  gb                                                   

Debbie Douglas,RN                  RN   ck1                                                  

Sendy Gamez RN                    RN   jo3                                                  

Mike Gabriel PA-C                 PACAMACHO ar2                                                  

Ashish Kerr                             mm15                                                 

                                                                                                  

The chart was reviewed and I authenticate all verbal orders and agree with the evaluation and 
treatment provided.Attachments:

10:19 [facility] Payment Agreement                                                                mm15

                                                                                             

12:34 T-Sheet-- Draft Copy                                                                    gb  

                                                                                                  

**************************************************************************************************



*** Chart Complete ***
MTDD

## 2017-06-13 ENCOUNTER — HOSPITAL ENCOUNTER (OUTPATIENT)
Dept: HOSPITAL 53 - M LAB REF | Age: 27
End: 2017-06-13
Attending: ADVANCED PRACTICE MIDWIFE
Payer: COMMERCIAL

## 2017-06-13 DIAGNOSIS — Z11.3: Primary | ICD-10-CM

## 2017-08-10 ENCOUNTER — HOSPITAL ENCOUNTER (OUTPATIENT)
Dept: HOSPITAL 53 - M LAB REF | Age: 27
End: 2017-08-10
Attending: ADVANCED PRACTICE MIDWIFE
Payer: COMMERCIAL

## 2017-08-10 DIAGNOSIS — N76.0: Primary | ICD-10-CM

## 2017-08-10 DIAGNOSIS — R30.0: Primary | ICD-10-CM

## 2017-09-30 ENCOUNTER — HOSPITAL ENCOUNTER (EMERGENCY)
Dept: HOSPITAL 53 - M ED | Age: 27
LOS: 1 days | Discharge: HOME | End: 2017-10-01
Payer: MEDICAID

## 2017-09-30 VITALS — WEIGHT: 220.46 LBS | HEIGHT: 64 IN | BODY MASS INDEX: 37.64 KG/M2

## 2017-09-30 DIAGNOSIS — F17.200: ICD-10-CM
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
## 2017-09-30 DIAGNOSIS — S61.419A: Primary | ICD-10-CM
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
## 2017-10-01 VITALS — SYSTOLIC BLOOD PRESSURE: 144 MMHG | DIASTOLIC BLOOD PRESSURE: 87 MMHG

## 2017-11-05 ENCOUNTER — HOSPITAL ENCOUNTER (EMERGENCY)
Dept: HOSPITAL 53 - M ED | Age: 27
Discharge: HOME | End: 2017-11-05
Payer: COMMERCIAL

## 2017-11-05 VITALS
WEIGHT: 200.42 LBS | BODY MASS INDEX: 34.22 KG/M2 | SYSTOLIC BLOOD PRESSURE: 188 MMHG | HEIGHT: 64 IN | DIASTOLIC BLOOD PRESSURE: 112 MMHG

## 2017-11-05 DIAGNOSIS — N39.0: Primary | ICD-10-CM

## 2017-11-05 DIAGNOSIS — Z72.0: ICD-10-CM

## 2017-11-05 LAB
BACTERIA URNS QL MICRO: (no result)
HYALINE CASTS URNS QL MICRO: (no result) /LPF (ref 0–1)
MICROSCOPIC EXAM: (no result)
MICROSCOPIC INDICATED? MAN: YES
RBC #/AREA URNS HPF: (no result) /HPF (ref 0–3)
SQUAMOUS URNS QL MICRO: (no result) /HPF
WBC #/AREA URNS HPF: (no result) /HPF (ref 0–3)

## 2018-03-16 ENCOUNTER — HOSPITAL ENCOUNTER (OUTPATIENT)
Dept: HOSPITAL 53 - M SFHCLERA | Age: 28
End: 2018-03-16
Attending: NURSE PRACTITIONER
Payer: COMMERCIAL
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
## 2018-03-16 DIAGNOSIS — S71.102A: Primary | ICD-10-CM
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
## 2018-04-18 ENCOUNTER — HOSPITAL ENCOUNTER (EMERGENCY)
Dept: HOSPITAL 53 - M ED | Age: 28
Discharge: HOME | End: 2018-04-18
Payer: COMMERCIAL

## 2018-04-18 DIAGNOSIS — Z79.899: ICD-10-CM

## 2018-04-18 DIAGNOSIS — K02.9: Primary | ICD-10-CM

## 2018-04-18 DIAGNOSIS — F17.200: ICD-10-CM

## 2018-04-18 PROCEDURE — 99283 EMERGENCY DEPT VISIT LOW MDM: CPT

## 2018-04-18 RX ADMIN — HYDROCODONE BITARTRATE AND ACETAMINOPHEN 1 TAB: 5; 325 TABLET ORAL at 08:30

## 2018-04-18 RX ADMIN — AMOXICILLIN AND CLAVULANATE POTASSIUM 1 MG: 875; 125 TABLET, FILM COATED ORAL at 08:30

## 2018-08-12 ENCOUNTER — HOSPITAL ENCOUNTER (EMERGENCY)
Dept: HOSPITAL 53 - M ED | Age: 28
LOS: 1 days | Discharge: LEFT BEFORE DISCHARGE | End: 2018-08-13
Payer: MEDICAID

## 2018-08-12 DIAGNOSIS — N93.9: Primary | ICD-10-CM

## 2018-08-12 LAB
BASO #: 0 10^3/UL (ref 0–0.2)
BASO %: 0.3 % (ref 0–1)
CONTROL LINE UCG: (no result)
EOS #: 0.2 10^3/UL (ref 0–0.5)
EOSINOPHIL NFR BLD AUTO: 1.3 % (ref 0–3)
HEMATOCRIT: 35.6 % (ref 36–47)
HEMOGLOBIN: 12.2 G/DL (ref 12–15.5)
IMMATURE GRANULOCYTE %: 0.5 % (ref 0–3)
LYMPH #: 2.9 10^3/UL (ref 1.5–6.5)
LYMPH %: 24.7 % (ref 24–44)
MEAN CORPUSCULAR HEMOGLOBIN: 31.9 PG (ref 27–33)
MEAN CORPUSCULAR HGB CONC: 34.3 G/DL (ref 32–36.5)
MEAN CORPUSCULAR VOLUME: 93 FL (ref 80–96)
MONO #: 0.6 10^3/UL (ref 0–0.8)
MONO %: 5.3 % (ref 0–5)
NEUTROPHILS #: 8.1 10^3/UL (ref 1.8–7.7)
NEUTROPHILS %: 67.9 % (ref 36–66)
NRBC BLD AUTO-RTO: 0 % (ref 0–0)
PLATELET COUNT, AUTOMATED: 298 10^3/UL (ref 150–450)
RED BLOOD COUNT: 3.83 10^6/UL (ref 4–5.4)
RED CELL DISTRIBUTION WIDTH: 12.4 % (ref 11.5–14.5)
URINE PREG TEST: POSITIVE
WHITE BLOOD COUNT: 11.9 10^3/UL (ref 4–10)

## 2018-08-12 PROCEDURE — 84703 CHORIONIC GONADOTROPIN ASSAY: CPT

## 2018-08-13 LAB
HCG, SERUM QUANTITATIVE: 925 MIU/ML
LEUKOCYTE ESTERASE UR AUTO RFX: NEGATIVE
SPECIFIC GRAVITY UR AUTO RFX: 1.03 (ref 1–1.03)
SQUAM EPITHELIAL CELL UR AURFX: 7 /HPF (ref 0–6)

## 2018-08-13 RX ADMIN — ACETAMINOPHEN 1 MG: 325 TABLET ORAL at 00:45

## 2019-01-05 ENCOUNTER — HOSPITAL ENCOUNTER (EMERGENCY)
Dept: HOSPITAL 53 - M ED | Age: 29
LOS: 1 days | Discharge: HOME | End: 2019-01-06
Payer: COMMERCIAL

## 2019-01-05 VITALS
DIASTOLIC BLOOD PRESSURE: 90 MMHG | WEIGHT: 220.46 LBS | SYSTOLIC BLOOD PRESSURE: 131 MMHG | HEIGHT: 63 IN | BODY MASS INDEX: 39.06 KG/M2

## 2019-01-05 DIAGNOSIS — F17.200: ICD-10-CM

## 2019-01-05 DIAGNOSIS — K04.7: Primary | ICD-10-CM

## 2019-01-05 DIAGNOSIS — K02.9: ICD-10-CM

## 2019-01-05 DIAGNOSIS — Z79.899: ICD-10-CM

## 2019-02-14 ENCOUNTER — HOSPITAL ENCOUNTER (OUTPATIENT)
Dept: HOSPITAL 53 - M LAB REF | Age: 29
End: 2019-02-14
Attending: PHYSICIAN ASSISTANT
Payer: COMMERCIAL

## 2019-02-14 DIAGNOSIS — J11.1: Primary | ICD-10-CM

## 2019-02-14 LAB
FLUAV RNA UPPER RESP QL NAA+PROBE: NEGATIVE
FLUBV RNA UPPER RESP QL NAA+PROBE: NEGATIVE

## 2019-04-29 ENCOUNTER — HOSPITAL ENCOUNTER (EMERGENCY)
Dept: HOSPITAL 53 - M ED | Age: 29
LOS: 1 days | Discharge: LEFT BEFORE DISCHARGE | End: 2019-04-30
Payer: COMMERCIAL

## 2019-04-29 VITALS
BODY MASS INDEX: 39.65 KG/M2 | SYSTOLIC BLOOD PRESSURE: 155 MMHG | WEIGHT: 215.46 LBS | DIASTOLIC BLOOD PRESSURE: 101 MMHG | HEIGHT: 62 IN

## 2019-04-29 DIAGNOSIS — M54.89: ICD-10-CM

## 2019-04-29 DIAGNOSIS — Z79.899: ICD-10-CM

## 2019-04-29 DIAGNOSIS — L50.0: Primary | ICD-10-CM

## 2019-04-29 DIAGNOSIS — F17.210: ICD-10-CM

## 2019-05-07 NOTE — EDDOCDS
Nurse's Notes                                                                                     

[facility]                                                                         

Name: Windy Arita                                                                              

Age: 26 yrs                                                                                       

Sex: Female                                                                                       

: 1990                                                                                   

MRN: [id number]                                                                                     

Arrival Date: 2017                                                                          

Time: 08:57                                                                                       

Account#: [id number]                                                                              

Bed PR                                                                                      

Private MD:                                                                                       

Diagnosis: Streptococcal pharyngitis;Urticaria                                                    

                                                                                                  

Presentation:                                                                                     

                                                                                             

09:11 Presenting complaint: Patient states: Started having a reaction three weeks ago to      jo3 

      unknown allergen. Was seen and treated here. Got better for three days until the            

      prednisone wore off. Has been seen again at [facility] and is again on Prednisone and        

      continues to break out. Also taking Benadryl. Has an appointment with the allergist         

      . Adult Sepsis Screening: The patient does not have new or worsening altered      

      mentation. Patient's respiratory rate is less than 22. Systolic blood pressure is           

      greater than 100. Patient has a qSOFA score of 0- Negative Sepsis Screen.                   

      Suicide/Homicide risk assessment- the patient denies having any suicidal and/or             

      homicidal ideations and does not present with any other emotional, behavioral or mental     

      health complaints.  Status: Patient is not a  or              

      dependent. Transition of care: patient was not received from another setting of care.       

09:11 Acuity: MARYCARMEN Level 3                                                                     jo3 

09:11 Method Of Arrival: Walkin/Carried/Asstd                                                 jo3 

                                                                                                  

Triage Assessment:                                                                                

09:19 General: Appears uncomfortable, Behavior is appropriate for age, cooperative, pleasant. jo3 

      HIV screening NA for this visit Offered previously. Neurological: No deficits noted.        

      Level of Consciousness is awake, alert, Oriented to person, place, time. Respiratory:       

      Airway is patent Respiratory effort is even, unlabored. Derm: Skin is pink, warm & dry.   

      Hive rash systemically.                                                                     

                                                                                                  

OB/GYN:                                                                                           

09:19 LMP 2017                                                                            jo3 

                                                                                                  

Historical:                                                                                       

- Allergies: No known drug Allergies;                                                             

- Home Meds:                                                                                      

1. Benadryl 50 mg Oral cap once daily                                                           

2. prednisone 20 mg Oral tab once daily                                                         

- PMHx: none;                                                                                     

- PSHx: none;                                                                                     

- Social history: Smoking status: Patient uses tobacco products, heavy tobacco smoker.            

No barriers to communication noted, The patient speaks fluent English, Speaks                   

appropriately for age.                                                                          

- Family history: Not pertinent.                                                                  

- : The pt / caregiver states he / she is not on anticoagulants. Home medication list             

is obtained from the patient.                                                                   

- Exposure Risk Screening:: None identified.                                                      

                                                                                                  

                                                                                                  

Screenin:42 Screening information is obtained from the patient. Fall risk: No risks identified.     ck1 

      Assistance ADL's: requires no assistance with activities of daily living. Abuse/DV          

      Screen: The patient / caregiver reports he/she is: not in a situation that causes fear,     

      pain or injury. Nutritional screening: No deficits noted. Advance Directives:               

      Currently, there is no health care proxy. home support is adequate.                         

                                                                                                  

Assessment:                                                                                       

09:42 General: Appears in no apparent distress, comfortable, Behavior is appropriate for age, ck1 

      cooperative. Pain: Location: generalized Pain currently is 8 out of 10 on a pain scale.     

      Quality of pain is described as soreness. Respiratory: Airway is patent Respiratory         

      effort is even, unlabored, Respiratory pattern is regular, symmetrical. Derm: Rash          

      noted that is red, raised, on face, back, abdomen, right arm and left arm Reports           

      itching.                                                                                    

10:44 General: Appears in no apparent distress, comfortable, Behavior is appropriate for age, ck1 

      cooperative. Pain: Denies pain. Neurological: Level of Consciousness is awake, alert,       

      obeys commands, Oriented to person, place, time. Respiratory: Respiratory effort is         

      unlabored, Respiratory pattern is regular, symmetrical. Derm: Rash noted that is red,       

      raised, on left arm and right arm and abdomen and back and face Denies itching.             

      Musculoskeletal: Circulation, motion, and sensation intact Range of motion intact in        

      all extremities.                                                                            

                                                                                                  

Vital Signs:                                                                                      

09:12  / 99; Pulse 110; Resp 18; Temp 97.5(O); Pulse Ox 98% on R/A; Weight 102.06 kg    jb5 

      (R); Height 5 ft. 3 in. (160.02 cm) (R); Pain 9/10;                                         

10:43  / 89; Pulse 107; Resp 18; Temp 97.9(T); Pulse Ox 97% on R/A; Pain 0/10;          ck1 

09:12 Body Mass Index 39.86 (102.06 kg, 160.02 cm)                                            jb5 

09:12 irritation                                                                              jb5 

                                                                                                  

Vitals:                                                                                           

09:19 Log In Time: 2017 at 08:56.                                                jo3 

09:42 Strep Screen is obtained and tested: Positive.                                          ck1 

                                                                                                  

ED Course:                                                                                        

08:59 Patient visited by Ashish Kerr.                                                    mm15

08:59 Patient moved to Waiting                                                                mm15

09:09 Patient moved to Triage 1                                                               jo3 

09:13 Patient visited by Lindsay Rosa PCA.                                                   jb5 

09:16 Triage Initiated                                                                        jo3 

09:22 Patient visited by Sendy Gamez RN.                                                jo3 

09:24 [facility]                                                        ar2 

09:24 Ruben Jones MD is Attending Physician.                                               ar2 

09:24 Patient visited by Mike Gabriel PA-C.                                             ar2 

09:42 Patient visited by Debbie Douglas RN.                                              ck1 

09:42 The patient / caregiver is instructed regarding the plan of care and ED course.         ck1 

09:45 Patient moved to PR                                                               ck1 

10:10 Patient visited by Lindsay Rosa PCA.                                                   jb5 

10:19 [facility] Payment Agreement was scanned into [employer] and attached to record.               mm15

10:44 No IV's were initiated during this patient's visit. No procedures done that require     ck1 

      assistance.                                                                                 

                                                                                             

12:34 T-Sheet-- Draft Copy was scanned into [employer] and attached to record.                   gb  

                                                                                                  

Administered Medications:                                                                         

                                                                                             

09:42 Drug: diphenhydrAMINE 50 mg [diphenhydramine 50 mg/mL injection solution (1 mL)] Route: ck1 

      IM; Site: right deltoid;                                                                    

                                                                                                  

                                                                                                  

Order Results:                                                                                    

There are currently no results for this order.                                                    

Outcome:                                                                                          

10:23 Discharge ordered by Provider.                                                          ar2 

10:44 Discharge Assessment: Patient awake, alert and oriented x 3. No cognitive and/or        ck1 

      functional deficits noted. Patient verbalized understanding of disposition                  

      instructions. patient administered narcotics - no. The following High Risk Discharge        

      criteria are identified: None. Discharged to home ambulatory, with friend. Condition:       

      improved. Discharge instructions given to patient, Instructed on discharge                  

      instructions, follow up and referral plans. medication usage, Demonstrated                  

      understanding of instructions, medications, Pt was receptive of discharge instructions/     

      teaching. Prescriptions given X 3. No special radiology studies were completed.             

      Property :Personal belongings accompany Pt.                                                 

10:45 Patient left the ED.                                                                    ck1 

                                                                                                  

Signatures:                                                                                       

Clarissa Walsh, Reg                  Reg  gb                                                   

Debbie Douglas RN RN   ck1                                                  

Lindsay Rosa PCA                       PCA  jb                                                  

Sendy Gamez RN RN jo3 Robertshaw, Aaron, ELIE                 PAStefanieC ar2                                                  

Ashish Kerr                             mm15                                                 

                                                                                                  

**************************************************************************************************



*** Chart Complete ***
MTDD Detail Level: Detailed General Sunscreen Counseling: I recommended a broad spectrum sunscreen with a SPF of 30 or higher. I explained that SPF 30 sunscreens block approximately 97 percent of the sun's harmful rays. Sunscreens should be applied at least 15 minutes prior to expected sun exposure and then every 2 hours after that as long as sun exposure continues. If swimming or exercising sunscreen should be reapplied every 45 minutes to an hour after getting wet or sweating. One ounce, or the equivalent of a shot glass full of sunscreen, is adequate to protect the skin not covered by a bathing suit. I also recommended a lip balm with a sunscreen as well.  Sun protective clothing can be used in lieu of sunscreen but must be worn the entire time you are exposed to the sun's ray General Sunscreen Counseling: I recommended a broad spectrum sunscreen with a SPF of 30 or higher.  I explained that SPF 30 sunscreens block approximately 97 percent of the sun's harmful rays.  Sunscreens should be applied at least 15 minutes prior to expected sun exposure and then every 2 hours after that as long as sun exposure continues. If swimming or exercising sunscreen should be reapplied every 45 minutes to an hour after getting wet or sweating.  One ounce, or the equivalent of a shot glass full of sunscreen, is adequate to protect the skin not covered by a bathing suit. I also recommended a lip balm with a sunscreen as well. Sun protective clothing can be used in lieu of sunscreen but must be worn the entire time you are exposed to the sun's ray

## 2020-08-21 ENCOUNTER — HOSPITAL ENCOUNTER (OUTPATIENT)
Dept: HOSPITAL 53 - M LAB REF | Age: 30
End: 2020-08-21
Attending: NURSE PRACTITIONER
Payer: COMMERCIAL

## 2020-08-21 DIAGNOSIS — Z11.3: Primary | ICD-10-CM

## 2020-08-21 LAB
CHLAMYDIA DNA AMPLIFICATION: NEGATIVE
N GONORRHOEA RRNA SPEC QL NAA+PROBE: NEGATIVE